# Patient Record
Sex: FEMALE | Race: WHITE
[De-identification: names, ages, dates, MRNs, and addresses within clinical notes are randomized per-mention and may not be internally consistent; named-entity substitution may affect disease eponyms.]

---

## 2018-06-19 ENCOUNTER — HOSPITAL ENCOUNTER (OUTPATIENT)
Dept: HOSPITAL 96 - M.CRD | Age: 68
End: 2018-06-19
Payer: MEDICARE

## 2018-06-19 DIAGNOSIS — R00.2: Primary | ICD-10-CM

## 2018-06-19 NOTE — 2DMMODE
[place] [address]
Phone:  [phone] 2 D/M-MODE ECHOCARDIOGRAM     
_______________________________________________________________________________
 
Name:         CHILO GOODE               Room:                     REG CLI
M.R.#:    [id number]     Account #:     [id number]  
Admission:    18    Attend Phys:   Physician not on s
Discharge:                Date of Birth: [birth year]  
Date of Service: 18 1334  Report #:      [id number]
        [id number]
_______________________________________________________________________________
THIS REPORT FOR:  //name//                      
 
 
--------------- APPROVED REPORT --------------
 
 
Study performed:  2018 11:39:09
 
EXAM: Comprehensive 2D, Doppler, and color-flow 
Echocardiogram 
      Status:  routine
 
   BSA:         2.11
HR: 70 bpm  
 
Other Information 
Study Quality: Fair
 
Indications
Palpitations
 
2D Dimensions
   LVEF(%):  70.39 (&gt;50%)
IVSd:  12.15 (7-11mm) LVOT Diam:  20.66 (18-24mm) 
LVDd:  47.02 mm  
PWd:  10.64 (7-11mm) Ascending Ao:  29.81 (22-36mm)
LVDs:  28.30 (25-40mm) 
Aortic Root:  27.46 mm 
   Torres's LVEF:  70.39 %
 
Volumes
Left Atrial Volume (Systole) 
    LA ESV Index:  16.70 mL/m2
 
Aortic Valve
AoV Peak Doe.:  1.38 m/s 
AO Peak Gr.:  7.56 mmHg  LVOT Max P.88 mmHg
AO Mean Gr.:  3.89 mmHg  LVOT Mean P.14 mmHg
    LVOT Max V:  1.10 m/s
AO V2 VTI:  24.85 cm  LVOT Mean V:  0.66 m/s
GWEN (VTI):  2.98 cm2  LVOT V1 VTI:  22.07 cm
 
Mitral Valve
    E/A Ratio:  0.85
    MV Decel. Time:  263.14 ms
MV E Max Doe.:  0.64 m/s 
 
 
[place] [address]
Phone:  [phone]                     2 D/M-MODE ECHOCARDIOGRAM     
_______________________________________________________________________________
 
Name:         RUSSELCHILO ZENY               Room:                     REG CLI
M.R.#:    [id number]     Account #:     [id number]  
Admission:    18    Attend Phys:   Physician not on s
Discharge:                Date of Birth: [birth year]  
Date of Service: 18 1334  Report #:      [id number]
        [id number]
_______________________________________________________________________________
MV PHT:  76.31 ms  
MVA (PHT):  2.88 cm2  
 
TDI
E/Lateral E':  8.00 E/Medial E':  8.00
   Medial E' Doe.:  0.08 m/s
   Lateral E' Doe.:  0.08 m/s
 
Pulmonary Valve
PV Peak Doe.:  1.10 m/s PV Peak Gr.:  4.84 mmHg
 
Left Ventricle
The left ventricle is normal size. There is normal LV segmental wall 
motion. Regional wall motion is not well visualized but grossly 
normal. There is normal left ventricular wall thickness. Left 
ventricular systolic function is normal. The left ventricular 
ejection fraction is within the normal range. LVEF is 55-60%. Grade I 
- abnormal relaxation pattern.
 
Right Ventricle
The right ventricle is normal size. The right ventricular systolic 
function is normal.
 
Atria
The left atrium size is normal. The right atrium size is 
normal.
 
Aortic Valve
The aortic valve is normal in structure. Aortic valve is not well 
visualized. No aortic regurgitation is present. There is no aortic 
valvular stenosis.
 
Mitral Valve
The mitral valve is normal in structure. There is no mitral valve 
regurgitation noted. No evidence of mitral valve stenosis.
 
Tricuspid Valve
The tricuspid valve is normal in structure. There is no tricuspid 
valve regurgitation noted.
 
Pulmonic Valve
The pulmonary valve is normal in structure. There is no pulmonic 
valvular regurgitation.
 
Great Vessels
The aortic root is normal in size. IVC is normal in size and 
 
 
[place] [address]
Phone:  [phone]                     2 D/M-MODE ECHOCARDIOGRAM     
_______________________________________________________________________________
 
Name:         CHILO GOODE               Room:                     REG CLI
M.R.#:    [id number]     Account #:     [id number]  
Admission:    18    Attend Phys:   Physician not on s
Discharge:                Date of Birth: [birth year]  
Date of Service: 18 1334  Report #:      [id number]
        [id number]
_______________________________________________________________________________
collapses with &gt;50% inspiration
 
Pericardium
There is no pericardial effusion.
 
&lt;Conclusion&gt;
LVEF is 55-60%.
There is normal LV segmental wall motion.
There is no aortic valvular stenosis.
No aortic regurgitation is present.
Grade I - abnormal relaxation pattern.
No evidence of mitral valve stenosis.
There is no mitral valve regurgitation noted.
 
 
 
 
 
 
 
 
 
 
 
 
 
 
 
 
 
 
 
 
 
 
 
 
 
 
 
 
 
 
 
  <ELECTRONICALLY SIGNED>
                                           By: Garland Bolden MD, FACC   
  18     1334
D: 18 1334   _____________________________________
T: 18 1334   Garland Bolden MD, FACC     /INF

## 2023-09-06 ENCOUNTER — TRANSFERRED RECORDS (OUTPATIENT)
Dept: HEALTH INFORMATION MANAGEMENT | Facility: CLINIC | Age: 73
End: 2023-09-06
Payer: COMMERCIAL